# Patient Record
Sex: MALE | Race: BLACK OR AFRICAN AMERICAN | Employment: FULL TIME | URBAN - METROPOLITAN AREA
[De-identification: names, ages, dates, MRNs, and addresses within clinical notes are randomized per-mention and may not be internally consistent; named-entity substitution may affect disease eponyms.]

---

## 2022-10-22 ENCOUNTER — HOSPITAL ENCOUNTER (EMERGENCY)
Age: 62
Discharge: HOME | End: 2022-10-23
Attending: EMERGENCY MEDICINE
Payer: COMMERCIAL

## 2022-10-22 DIAGNOSIS — R07.9 CHEST PAIN, UNSPECIFIED TYPE: Primary | ICD-10-CM

## 2022-10-22 PROCEDURE — 99285 EMERGENCY DEPT VISIT HI MDM: CPT

## 2022-10-22 PROCEDURE — 96375 TX/PRO/DX INJ NEW DRUG ADDON: CPT

## 2022-10-22 PROCEDURE — 96374 THER/PROPH/DIAG INJ IV PUSH: CPT

## 2022-10-22 PROCEDURE — 93005 ELECTROCARDIOGRAM TRACING: CPT | Performed by: EMERGENCY MEDICINE

## 2022-10-22 ASSESSMENT — PAIN DESCRIPTION - DESCRIPTORS: DESCRIPTORS: PRESSURE

## 2022-10-22 ASSESSMENT — PAIN - FUNCTIONAL ASSESSMENT: PAIN_FUNCTIONAL_ASSESSMENT: 0-10

## 2022-10-22 ASSESSMENT — PAIN DESCRIPTION - FREQUENCY: FREQUENCY: CONTINUOUS

## 2022-10-22 ASSESSMENT — PAIN DESCRIPTION - LOCATION: LOCATION: CHEST

## 2022-10-22 ASSESSMENT — PAIN SCALES - GENERAL: PAINLEVEL_OUTOF10: 10

## 2022-10-23 ENCOUNTER — APPOINTMENT (OUTPATIENT)
Dept: GENERAL RADIOLOGY | Age: 62
End: 2022-10-23
Payer: COMMERCIAL

## 2022-10-23 VITALS
BODY MASS INDEX: 21.48 KG/M2 | DIASTOLIC BLOOD PRESSURE: 68 MMHG | WEIGHT: 145 LBS | SYSTOLIC BLOOD PRESSURE: 117 MMHG | HEART RATE: 92 BPM | HEIGHT: 69 IN | RESPIRATION RATE: 13 BRPM | TEMPERATURE: 97.9 F | OXYGEN SATURATION: 100 %

## 2022-10-23 LAB
A/G RATIO: 1.5 (ref 1.1–2.2)
ALBUMIN SERPL-MCNC: 3.8 G/DL (ref 3.4–5)
ALP BLD-CCNC: 86 U/L (ref 40–129)
ALT SERPL-CCNC: 39 U/L (ref 10–40)
ANION GAP SERPL CALCULATED.3IONS-SCNC: 14 MMOL/L (ref 3–16)
AST SERPL-CCNC: 47 U/L (ref 15–37)
BILIRUB SERPL-MCNC: 0.5 MG/DL (ref 0–1)
BUN BLDV-MCNC: 13 MG/DL (ref 7–20)
CALCIUM SERPL-MCNC: 9.2 MG/DL (ref 8.3–10.6)
CHLORIDE BLD-SCNC: 107 MMOL/L (ref 99–110)
CO2: 19 MMOL/L (ref 21–32)
CREAT SERPL-MCNC: 1.1 MG/DL (ref 0.8–1.3)
EKG ATRIAL RATE: 82 BPM
EKG DIAGNOSIS: NORMAL
EKG P AXIS: 65 DEGREES
EKG P-R INTERVAL: 170 MS
EKG Q-T INTERVAL: 350 MS
EKG QRS DURATION: 78 MS
EKG QTC CALCULATION (BAZETT): 408 MS
EKG R AXIS: 42 DEGREES
EKG T AXIS: 54 DEGREES
EKG VENTRICULAR RATE: 82 BPM
GFR SERPL CREATININE-BSD FRML MDRD: >60 ML/MIN/{1.73_M2}
GLUCOSE BLD-MCNC: 109 MG/DL (ref 70–99)
HCT VFR BLD CALC: 40.5 % (ref 40.5–52.5)
HEMOGLOBIN: 13.4 G/DL (ref 13.5–17.5)
LIPASE: 28 U/L (ref 13–60)
MCH RBC QN AUTO: 33.6 PG (ref 26–34)
MCHC RBC AUTO-ENTMCNC: 33 G/DL (ref 31–36)
MCV RBC AUTO: 101.6 FL (ref 80–100)
PDW BLD-RTO: 12.9 % (ref 12.4–15.4)
PLATELET # BLD: 206 K/UL (ref 135–450)
PMV BLD AUTO: 7.4 FL (ref 5–10.5)
POTASSIUM REFLEX MAGNESIUM: 3.8 MMOL/L (ref 3.5–5.1)
PRO-BNP: 10 PG/ML (ref 0–124)
RBC # BLD: 3.99 M/UL (ref 4.2–5.9)
SODIUM BLD-SCNC: 140 MMOL/L (ref 136–145)
TOTAL PROTEIN: 6.4 G/DL (ref 6.4–8.2)
TROPONIN: <0.01 NG/ML
TROPONIN: <0.01 NG/ML
WBC # BLD: 10 K/UL (ref 4–11)

## 2022-10-23 PROCEDURE — 36415 COLL VENOUS BLD VENIPUNCTURE: CPT

## 2022-10-23 PROCEDURE — 84484 ASSAY OF TROPONIN QUANT: CPT

## 2022-10-23 PROCEDURE — 6360000002 HC RX W HCPCS: Performed by: STUDENT IN AN ORGANIZED HEALTH CARE EDUCATION/TRAINING PROGRAM

## 2022-10-23 PROCEDURE — C9113 INJ PANTOPRAZOLE SODIUM, VIA: HCPCS | Performed by: STUDENT IN AN ORGANIZED HEALTH CARE EDUCATION/TRAINING PROGRAM

## 2022-10-23 PROCEDURE — 85027 COMPLETE CBC AUTOMATED: CPT

## 2022-10-23 PROCEDURE — 96375 TX/PRO/DX INJ NEW DRUG ADDON: CPT

## 2022-10-23 PROCEDURE — 80053 COMPREHEN METABOLIC PANEL: CPT

## 2022-10-23 PROCEDURE — 96374 THER/PROPH/DIAG INJ IV PUSH: CPT

## 2022-10-23 PROCEDURE — 6370000000 HC RX 637 (ALT 250 FOR IP): Performed by: STUDENT IN AN ORGANIZED HEALTH CARE EDUCATION/TRAINING PROGRAM

## 2022-10-23 PROCEDURE — 83690 ASSAY OF LIPASE: CPT

## 2022-10-23 PROCEDURE — 71045 X-RAY EXAM CHEST 1 VIEW: CPT

## 2022-10-23 PROCEDURE — 83880 ASSAY OF NATRIURETIC PEPTIDE: CPT

## 2022-10-23 RX ORDER — ASPIRIN 325 MG
325 TABLET ORAL ONCE
Status: COMPLETED | OUTPATIENT
Start: 2022-10-23 | End: 2022-10-23

## 2022-10-23 RX ORDER — CALCIUM CARBONATE 500(1250)
500 TABLET ORAL DAILY
COMMUNITY

## 2022-10-23 RX ORDER — AMLODIPINE BESYLATE 10 MG/1
10 TABLET ORAL DAILY
COMMUNITY

## 2022-10-23 RX ORDER — ATORVASTATIN CALCIUM 10 MG/1
10 TABLET, FILM COATED ORAL DAILY
COMMUNITY

## 2022-10-23 RX ORDER — MORPHINE SULFATE 4 MG/ML
4 INJECTION, SOLUTION INTRAMUSCULAR; INTRAVENOUS
Status: COMPLETED | OUTPATIENT
Start: 2022-10-23 | End: 2022-10-23

## 2022-10-23 RX ORDER — OMEPRAZOLE 20 MG/1
20 CAPSULE, DELAYED RELEASE ORAL
Qty: 30 CAPSULE | Refills: 0 | Status: SHIPPED | OUTPATIENT
Start: 2022-10-23

## 2022-10-23 RX ORDER — ONDANSETRON 2 MG/ML
8 INJECTION INTRAMUSCULAR; INTRAVENOUS ONCE
Status: COMPLETED | OUTPATIENT
Start: 2022-10-23 | End: 2022-10-23

## 2022-10-23 RX ORDER — PANTOPRAZOLE SODIUM 40 MG/10ML
40 INJECTION, POWDER, LYOPHILIZED, FOR SOLUTION INTRAVENOUS ONCE
Status: COMPLETED | OUTPATIENT
Start: 2022-10-23 | End: 2022-10-23

## 2022-10-23 RX ADMIN — ASPIRIN 325 MG ORAL TABLET 325 MG: 325 PILL ORAL at 00:32

## 2022-10-23 RX ADMIN — MORPHINE SULFATE 4 MG: 4 INJECTION, SOLUTION INTRAMUSCULAR; INTRAVENOUS at 00:28

## 2022-10-23 RX ADMIN — ONDANSETRON 8 MG: 2 INJECTION INTRAMUSCULAR; INTRAVENOUS at 00:28

## 2022-10-23 RX ADMIN — PANTOPRAZOLE SODIUM 40 MG: 40 INJECTION, POWDER, FOR SOLUTION INTRAVENOUS at 01:24

## 2022-10-23 RX ADMIN — Medication: at 01:24

## 2022-10-23 ASSESSMENT — PAIN SCALES - GENERAL: PAINLEVEL_OUTOF10: 10

## 2022-10-23 NOTE — ED NOTES
Discharge instructions given per provider order. 1 Prescription(s) reviewed. Patient verbalized understanding.         Maranda Landers RN  10/23/22 2089

## 2022-10-23 NOTE — ED PROVIDER NOTES
ED Attending Attestation Note     Date of evaluation: 10/22/2022    This patient was seen by the resident. I have seen and examined the patient, agree with the workup, evaluation, management and diagnosis. The care plan has been discussed. I have reviewed the ECG and concur with the resident's interpretation. My assessment reveals a 49-year-old male presenting to the emergency department with complaint of epigastric abdominal pain and chest pain. On examination patient has epigastric tenderness to palpation without rebound or guarding.      Piper Apodaca MD  10/23/22 6168

## 2022-10-23 NOTE — ED PROVIDER NOTES
[id number] [facility] RESIDENT NOTE       Date of evaluation: 10/22/2022    Chief Complaint     Chest Pain (Patient states chest pressure 2300. States was sitting at work and felt heaviness in chest with sweating and nausea. )    of Present Illness     Miladis Britt is a pleasant 58 y.o. male with history of hypertension, hyperlipidemia, smoking who presents with chest pain. Patient states about 1 hour prior to arrival he had sudden onset \"throbbing\" chest pain radiating down to his upper epigastrium. He was sitting at work when the chest pain started and it has remained the same since. He states it is similar episode several months ago that resolved spontaneously after several hours. He denies any known cardiac history, has never had a stress test or cardiac ultrasound. His chest pain has been accompanied by shortness of breath, nausea with several episodes of NBNB vomiting. He denies any recent fevers or illnesses. No changes in bowel or bladder habits. He has not yet taken anything for pain. The patient denies any aggravating or alleviating factors or associated symptoms other than discussed above. Review of Systems     A comprehensive review of systems was performed and negative except as noted previously in the HPI. Past Medical, Surgical, Family, and Social History     He has a past medical history of Hyperlipidemia. He has no past surgical history on file. His family history is not on file. He reports that he has been smoking cigarettes. He has been smoking an average of .5 packs per day. He does not have any smokeless tobacco history on file. He reports current alcohol use of about 3.0 standard drinks per week. He reports that he does not use drugs.     Medications     Discharge Medication List as of 10/23/2022  2:16 AM        CONTINUE these medications which have NOT CHANGED    Details   amLODIPine (NORVASC) 10 MG tablet Take 10 mg by mouth dailyHistorical Med      calcium carbonate (OSCAL) 500 MG TABS tablet Take 500 mg by mouth dailyHistorical Med      atorvastatin (LIPITOR) 10 MG tablet Take 10 mg by mouth daily Unsure of doseHistorical Med           Allergies     He is allergic to lisinopril. Physical Exam     INITIAL VITALS: BP: 134/84, Temp: 97.9 °F (36.6 °C), Heart Rate: 90, Resp: 15, SpO2: 100 %     General: Overall well-appearing. Head: Normocephalic/atraumatic. Eyes: Anicteric. Pupils reactive. No discharge from eyes. ENT: External ears normal. Patient wearing mask. Neck: Supple, trachea midline. Pulmonary: Non-labored breathing. Breath sounds clear bilaterally. Cardiac: Regular rate. Regular rhythm. No murmurs. Abdomen: Soft. Non-distended. Non-tender. Musculoskeletal: No long bone deformity. Skin: Dry, no rashes. Extremities: Warm and well perfused. No peripheral edema. Neuro: A&O x3. Moves all four extremities to command. Sensation grossly intact to light touch. No focal deficit. Psych: Mood and affect appropriate for situation. DiagnosticResults     EKG   Indication: Chest pain    Rate: 82 bpm  Rhythm: Normal sinus  Axis: Normal  Intervals:  QRS 78 QT/QTc 350/408  Ischemia: None  Ectopy: None  Other: Borderline LVH based on voltage  Comparison: None available    Clinical Impression: Normal sinus rhythm without evidence of acute scheming    Interpreted in conjunction with emergencydepartment physician Pedro Boykin MD    RADIOLOGY:  XR CHEST PORTABLE   Final Result   1. No acute cardiopulmonary abnormalities.         LABS:   Results for orders placed or performed during the hospital encounter of 10/22/22   CBC   Result Value Ref Range    WBC 10.0 4.0 - 11.0 K/uL    RBC 3.99 (L) 4.20 - 5.90 M/uL    Hemoglobin 13.4 (L) 13.5 - 17.5 g/dL    Hematocrit 40.5 40.5 - 52.5 %    .6 (H) 80.0 - 100.0 fL    MCH 33.6 26.0 - 34.0 pg    MCHC 33.0 31.0 - 36.0 g/dL    RDW 12.9 12.4 - 15.4 %    Platelets 329 565 - 321 K/uL    MPV 7.4 5.0 - 10.5 fL   CMP w/ Reflex to MG   Result Value Ref Range    Sodium 140 136 - 145 mmol/L    Potassium reflex Magnesium 3.8 3.5 - 5.1 mmol/L    Chloride 107 99 - 110 mmol/L    CO2 19 (L) 21 - 32 mmol/L    Anion Gap 14 3 - 16    Glucose 109 (H) 70 - 99 mg/dL    BUN 13 7 - 20 mg/dL    Creatinine 1.1 0.8 - 1.3 mg/dL    Est, Glom Filt Rate >60 >60    Calcium 9.2 8.3 - 10.6 mg/dL    Total Protein 6.4 6.4 - 8.2 g/dL    Albumin 3.8 3.4 - 5.0 g/dL    Albumin/Globulin Ratio 1.5 1.1 - 2.2    Total Bilirubin 0.5 0.0 - 1.0 mg/dL    Alkaline Phosphatase 86 40 - 129 U/L    ALT 39 10 - 40 U/L    AST 47 (H) 15 - 37 U/L   Troponin   Result Value Ref Range    Troponin <0.01 <0.01 ng/mL   Troponin   Result Value Ref Range    Troponin <0.01 <0.01 ng/mL   Lipase   Result Value Ref Range    Lipase 28.0 13.0 - 60.0 U/L   Brain Natriuretic Peptide   Result Value Ref Range    Pro-BNP 10 0 - 124 pg/mL       ED BEDSIDE ULTRASOUND:  No bedside ultrasound performed. RECENT VITALS:  BP: 117/68, Temp: 97.9 °F (36.6 °C), Heart Rate: 92,Resp: 13, SpO2: 100 %     Procedures     No ED procedures performed. ED Course     Nursing Notes, Past Medical Hx, Past Surgical Hx, Social Hx, Allergies, and Family Hx were reviewed. The patient was given the followingmedications:  Orders Placed This Encounter   Medications    morphine injection 4 mg    ondansetron (ZOFRAN) injection 8 mg    aspirin tablet 325 mg    pantoprazole (PROTONIX) injection 40 mg    aluminum & magnesium hydroxide-simethicone (MAALOX) 30 mL, lidocaine viscous hcl (XYLOCAINE) 5 mL (GI COCKTAIL)    omeprazole (PRILOSEC) 20 MG delayed release capsule     Sig: Take 1 capsule by mouth every morning (before breakfast)     Dispense:  30 capsule     Refill:  0     CONSULTS:  None    MEDICAL DECISION MAKING / ASSESSMENT / Viky Seat is a 58 y.o. male with a history and presentation as described above in HPI.  Appropriate labs and diagnostic studies were reviewed as they were made available. Pertinent laboratory studies in medical decision making are listed below. Upon presentation, the patient was well-appearing, afebrile, and hemodynamically stable. Based on the patient's description of their pain and their risk factors, I initially had a moderate suspicion for ACS as etiology of her chest pain and treated with aspirin and morphine without significant improvement in pain. On his cardiac work-up, there are no signs of ST elevation or ischemia on EKG and troponins collected have been negative making acute myocardial infarction unlikely. EKG is also not consistent with pericarditis. Pneumonia, aortic dissection and pneumothorax are unlikely based on the results of their CXR. He also has equally bilateral radial pulses. PE is unlikely based on the patient's history, their lack of tachycardia, hypoxia, and tachypnea. Further evaluation for PE was felt to be unnecessary due as the [facility] criteria were not met. Given lower concern for cardiac causes following the work-up above, trialed Protonix and a GI cocktail which did result in moderate improvement in symptoms. Hepatic function testing and lipase were largely unremarkable, mildly elevated AST but not consistent with acute hepatitis. At this point, suspect that reflux is most likely etiology the patient's pain and so we will trial outpatient management with omeprazole. However, with his age and risk factors cardiac causes of chest pain are certainly high risk. He has never had formal stress testing or an outpatient echocardiogram.  He does have a primary care provider that he sees regularly and can call Monday morning to schedule an outpatient appointment. We will refer to them to help schedule a stress test and any additional cardiac work-up that may be indicated. Patient was comfortable with this plan and is discharged in stable condition.     This patient was also evaluated by the attending physician, Dr. Adriel Resendez MD. All care plans were discussed and agreed upon. Clinical Impression     1. Chest pain, unspecified type        Disposition     PATIENT REFERRED TO:  Your PCP    Call in 1 day      DISCHARGE MEDICATIONS:  Discharge Medication List as of 10/23/2022  2:16 AM        START taking these medications    Details   omeprazole (PRILOSEC) 20 MG delayed release capsule Take 1 capsule by mouth every morning (before breakfast), Disp-30 capsule, R-0Print             DISPOSITION Decision To Discharge 10/23/2022 02:07:14 AM    At this time, the patient was deemed appropriate for discharge. My customary discharge instructions, including strict return precautions for new or worsening symptoms concerning to the patient, were provided. All of patient's questions were answered satisfactorily, and she was subsequently sent home in stable condition. Cielo Rodriguez MD  PGY-2 Emergency [facility]    This note was dictated using voice-recognition software, which occasionally leads to inadvertent typographic errors.      Renate Duffy MD  Resident  10/23/22 0549

## 2025-03-11 ENCOUNTER — HOSPITAL ENCOUNTER (EMERGENCY)
Age: 65
Discharge: LEFT BEFORE DISCHARGE | End: 2025-03-11
Attending: INTERNAL MEDICINE
Payer: COMMERCIAL

## 2025-03-11 ENCOUNTER — APPOINTMENT (OUTPATIENT)
Dept: CT IMAGING | Age: 65
End: 2025-03-11
Payer: COMMERCIAL

## 2025-03-11 VITALS
DIASTOLIC BLOOD PRESSURE: 75 MMHG | RESPIRATION RATE: 18 BRPM | SYSTOLIC BLOOD PRESSURE: 120 MMHG | HEIGHT: 69 IN | HEART RATE: 97 BPM | TEMPERATURE: 98.6 F | OXYGEN SATURATION: 96 % | WEIGHT: 135.7 LBS | BODY MASS INDEX: 20.1 KG/M2

## 2025-03-11 DIAGNOSIS — K82.8 GALLBLADDER MASS: ICD-10-CM

## 2025-03-11 DIAGNOSIS — K81.0 ACUTE CHOLECYSTITIS: Primary | ICD-10-CM

## 2025-03-11 DIAGNOSIS — R74.01 TRANSAMINITIS: ICD-10-CM

## 2025-03-11 DIAGNOSIS — E87.1 HYPONATREMIA: ICD-10-CM

## 2025-03-11 LAB
ALBUMIN SERPL-MCNC: 3.9 G/DL (ref 3.4–5)
ALBUMIN/GLOB SERPL: 0.9 {RATIO} (ref 1.1–2.2)
ALP SERPL-CCNC: 158 U/L (ref 40–129)
ALT SERPL-CCNC: 33 U/L (ref 10–40)
ANION GAP SERPL CALCULATED.3IONS-SCNC: 13 MMOL/L (ref 3–16)
AST SERPL-CCNC: 64 U/L (ref 15–37)
BACTERIA URNS QL MICRO: ABNORMAL /HPF
BASOPHILS # BLD: 0 K/UL (ref 0–0.2)
BASOPHILS NFR BLD: 0.5 %
BILIRUB SERPL-MCNC: 0.6 MG/DL (ref 0–1)
BILIRUB UR QL STRIP.AUTO: ABNORMAL
BUN SERPL-MCNC: 8 MG/DL (ref 7–20)
CALCIUM SERPL-MCNC: 9.8 MG/DL (ref 8.3–10.6)
CHLORIDE SERPL-SCNC: 97 MMOL/L (ref 99–110)
CLARITY UR: CLEAR
CO2 SERPL-SCNC: 24 MMOL/L (ref 21–32)
COLOR UR: ABNORMAL
CREAT SERPL-MCNC: 1 MG/DL (ref 0.8–1.3)
DEPRECATED RDW RBC AUTO: 13.6 % (ref 12.4–15.4)
EOSINOPHIL # BLD: 0 K/UL (ref 0–0.6)
EOSINOPHIL NFR BLD: 0.4 %
EPI CELLS #/AREA URNS AUTO: 5 /HPF (ref 0–5)
GFR SERPLBLD CREATININE-BSD FMLA CKD-EPI: 84 ML/MIN/{1.73_M2}
GLUCOSE SERPL-MCNC: 262 MG/DL (ref 70–99)
GLUCOSE UR STRIP.AUTO-MCNC: NEGATIVE MG/DL
HCT VFR BLD AUTO: 42.4 % (ref 40.5–52.5)
HGB BLD-MCNC: 14.3 G/DL (ref 13.5–17.5)
HGB UR QL STRIP.AUTO: NEGATIVE
HYALINE CASTS #/AREA URNS AUTO: 54 /LPF (ref 0–8)
KETONES UR STRIP.AUTO-MCNC: ABNORMAL MG/DL
LEUKOCYTE ESTERASE UR QL STRIP.AUTO: ABNORMAL
LIPASE SERPL-CCNC: 39 U/L (ref 13–60)
LYMPHOCYTES # BLD: 1.4 K/UL (ref 1–5.1)
LYMPHOCYTES NFR BLD: 15.7 %
MCH RBC QN AUTO: 34.2 PG (ref 26–34)
MCHC RBC AUTO-ENTMCNC: 33.8 G/DL (ref 31–36)
MCV RBC AUTO: 101.2 FL (ref 80–100)
MONOCYTES # BLD: 0.8 K/UL (ref 0–1.3)
MONOCYTES NFR BLD: 9.2 %
NEUTROPHILS # BLD: 6.4 K/UL (ref 1.7–7.7)
NEUTROPHILS NFR BLD: 74.2 %
NITRITE UR QL STRIP.AUTO: NEGATIVE
PH UR STRIP.AUTO: 5.5 [PH] (ref 5–8)
PLATELET # BLD AUTO: 218 K/UL (ref 135–450)
PMV BLD AUTO: 7 FL (ref 5–10.5)
POTASSIUM SERPL-SCNC: 3.8 MMOL/L (ref 3.5–5.1)
PROT SERPL-MCNC: 8.2 G/DL (ref 6.4–8.2)
PROT UR STRIP.AUTO-MCNC: 100 MG/DL
RBC # BLD AUTO: 4.19 M/UL (ref 4.2–5.9)
RBC #/AREA URNS HPF: ABNORMAL /HPF (ref 0–4)
SODIUM SERPL-SCNC: 134 MMOL/L (ref 136–145)
SP GR UR STRIP.AUTO: 1.02 (ref 1–1.03)
UA COMPLETE W REFLEX CULTURE PNL UR: ABNORMAL
UA DIPSTICK W REFLEX MICRO PNL UR: YES
URN SPEC COLLECT METH UR: ABNORMAL
UROBILINOGEN UR STRIP-ACNC: 1 E.U./DL
WBC # BLD AUTO: 8.7 K/UL (ref 4–11)
WBC #/AREA URNS AUTO: 6 /HPF (ref 0–5)

## 2025-03-11 PROCEDURE — 87150 DNA/RNA AMPLIFIED PROBE: CPT

## 2025-03-11 PROCEDURE — 83690 ASSAY OF LIPASE: CPT

## 2025-03-11 PROCEDURE — 96375 TX/PRO/DX INJ NEW DRUG ADDON: CPT

## 2025-03-11 PROCEDURE — 96365 THER/PROPH/DIAG IV INF INIT: CPT

## 2025-03-11 PROCEDURE — 6360000004 HC RX CONTRAST MEDICATION: Performed by: INTERNAL MEDICINE

## 2025-03-11 PROCEDURE — 74177 CT ABD & PELVIS W/CONTRAST: CPT

## 2025-03-11 PROCEDURE — 85025 COMPLETE CBC W/AUTO DIFF WBC: CPT

## 2025-03-11 PROCEDURE — 99285 EMERGENCY DEPT VISIT HI MDM: CPT

## 2025-03-11 PROCEDURE — APPNB30 APP NON BILLABLE TIME 0-30 MINS: Performed by: NURSE PRACTITIONER

## 2025-03-11 PROCEDURE — 2580000003 HC RX 258: Performed by: INTERNAL MEDICINE

## 2025-03-11 PROCEDURE — 80053 COMPREHEN METABOLIC PANEL: CPT

## 2025-03-11 PROCEDURE — 87040 BLOOD CULTURE FOR BACTERIA: CPT

## 2025-03-11 PROCEDURE — 6360000002 HC RX W HCPCS: Performed by: INTERNAL MEDICINE

## 2025-03-11 PROCEDURE — APPSS60 APP SPLIT SHARED TIME 46-60 MINUTES: Performed by: NURSE PRACTITIONER

## 2025-03-11 PROCEDURE — 81001 URINALYSIS AUTO W/SCOPE: CPT

## 2025-03-11 RX ORDER — KETOROLAC TROMETHAMINE 15 MG/ML
15 INJECTION, SOLUTION INTRAMUSCULAR; INTRAVENOUS ONCE
Status: COMPLETED | OUTPATIENT
Start: 2025-03-11 | End: 2025-03-11

## 2025-03-11 RX ORDER — IOPAMIDOL 755 MG/ML
75 INJECTION, SOLUTION INTRAVASCULAR
Status: COMPLETED | OUTPATIENT
Start: 2025-03-11 | End: 2025-03-11

## 2025-03-11 RX ORDER — 0.9 % SODIUM CHLORIDE 0.9 %
1000 INTRAVENOUS SOLUTION INTRAVENOUS ONCE
Status: COMPLETED | OUTPATIENT
Start: 2025-03-11 | End: 2025-03-11

## 2025-03-11 RX ADMIN — IOPAMIDOL 75 ML: 755 INJECTION, SOLUTION INTRAVENOUS at 06:19

## 2025-03-11 RX ADMIN — KETOROLAC TROMETHAMINE 15 MG: 15 INJECTION, SOLUTION INTRAMUSCULAR; INTRAVENOUS at 06:48

## 2025-03-11 RX ADMIN — PIPERACILLIN AND TAZOBACTAM 3375 MG: 3; .375 INJECTION, POWDER, LYOPHILIZED, FOR SOLUTION INTRAVENOUS at 08:06

## 2025-03-11 RX ADMIN — SODIUM CHLORIDE 1000 ML: 0.9 INJECTION, SOLUTION INTRAVENOUS at 06:48

## 2025-03-11 ASSESSMENT — PAIN DESCRIPTION - LOCATION: LOCATION: ABDOMEN

## 2025-03-11 ASSESSMENT — LIFESTYLE VARIABLES
HOW OFTEN DO YOU HAVE A DRINK CONTAINING ALCOHOL: 4 OR MORE TIMES A WEEK
HOW MANY STANDARD DRINKS CONTAINING ALCOHOL DO YOU HAVE ON A TYPICAL DAY: 5 OR 6

## 2025-03-11 ASSESSMENT — PAIN SCALES - GENERAL
PAINLEVEL_OUTOF10: 0
PAINLEVEL_OUTOF10: 8
PAINLEVEL_OUTOF10: 7

## 2025-03-11 ASSESSMENT — PAIN - FUNCTIONAL ASSESSMENT: PAIN_FUNCTIONAL_ASSESSMENT: 0-10

## 2025-03-11 NOTE — ED PROVIDER NOTES
EMERGENCY MEDICINE PROVIDER NOTE    Patient Identification  Pt Name: Dejan Benz  MRN: [id number]  Birthdate 1960  Date of evaluation: 3/11/2025  Provider: ERASMO CLANCY DO  PCP: No primary care provider on file.    Chief Complaint  Abdominal Pain (Pt arrives to ED via self c/o right sided abdominal and flank pain, states pain starting yesterday morning, denies GI symptoms, just c/o pain. Pt states pain is worse when laying down or moving.)      HPI  (History provided by patient)  This is a 64 y.o. male who was brought in by self for right upper quadrant and right flank pain that started yesterday.  The pain is worse over the right flank and worse when he pushes in.  He denies nausea and vomiting.  Patient denies take anything for pain.  Patient states it is worse when he is moving and lying down.  He still has his gallbladder.  He denies hematuria and dysuria.  He does not have any fever and chills.  Pain is moderate in intensity.    I have reviewed the following nursing documentation:  Allergies: Lisinopril    Past medical history:   Past Medical History:   Diagnosis Date    Hyperlipidemia     Hypertension      Past surgical history: No past surgical history on file.    Home medications:   Discharge Medication List as of 3/11/2025 10:33 AM        CONTINUE these medications which have NOT CHANGED    Details   amLODIPine (NORVASC) 10 MG tablet Take 1 tablet by mouth dailyHistorical Med      atorvastatin (LIPITOR) 10 MG tablet Take 1 tablet by mouth daily Unsure of doseHistorical Med      omeprazole (PRILOSEC) 20 MG delayed release capsule Take 1 capsule by mouth every morning (before breakfast), Disp-30 capsule, R-0Print      calcium carbonate (OSCAL) 500 MG TABS tablet Take 500 mg by mouth dailyHistorical Med             Social history:  reports that he has been smoking cigarettes. He does not have any smokeless tobacco history on file. He reports current alcohol use of about 3.0 standard drinks of alcohol
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
## 2025-03-11 NOTE — ED NOTES
Patient and wife called this RN to bedside. Patient asked what was taking so long, this RN informed patient he was waiting for ultrasound of his gal bladder, patient and wife requested wait time, informed wait time cannot be given and unsure where patient is on this list of scans for ultrasound. Patient and wife stated they had disabled daughter at school and needed to be home when she got out of school. Patient's wife stated \"they told us he was going to get an ultrasound and then general surgery would come back to see patient\", this RN stated that was the plan and we are waiting for ultrasound. Wife also stated \"they told us he would be down here until he got a bed upstairs and he's hungry and getting irritated and doesn't want to wait anymore. I have an appt scheduled with my primary care doctor and I have an MRI of my stomach scheduled, I don't need to ultrasound today\". Patient requested his IV be taken out so he could leave, did not want to wait for provider to talk to and did not want to sign any paper.     Dr. Peñaloza and Karen with general surgery updated. Charge RN made aware.     Patient and wife ambulatory to exit without difficulty.

## 2025-03-13 LAB — REPORT: NORMAL

## 2025-03-13 NOTE — ED NOTES
I called the pt at [phone] and spoke with him about coming in to be seen due to the positive culture. Explained to him what positive blood cultures mean. Pt agreed but stated, \"I will try to come in tomorrow (3/14) I just don't want to go through the whole 5-6 hour process\"    Ayleen Bond, PharmD  PGY-1 Pharmacy Resident

## 2025-03-13 NOTE — PROGRESS NOTES
Received a call from Microbiology 3/12/25 @2100 regarding a positive blood culture - 2 bottles positive with gram positive cocci clusters. Spoke Dr. Adler and he recommended that the pt come back to the ED to be seen. I called the pt at [phone] and spoke with him about coming in to be seen due to the positive culture. Pt agreed but stated, 'I may not be able to come today but I will try to come first thing Monday morning'.

## 2025-03-14 NOTE — PROGRESS NOTES
I called the pt at [phone] and spoke with him about coming in to be seen due to the positive culture. I discussed that this is a follow-up call to advise him to return to the ER for treatment or assessment. I did have to explain what blood cultures mean despite previous notes also stating something similar. Patient did not want to return to the ER due to long wait times but agreed to see a provider if he started to notice fevers, chills or general signs of illness.     Agustín Bazan, PharmD  PGY-1 Pharmacy Resident   [facility]  [id number]

## 2025-03-15 LAB
BACTERIA BLD CULT ORG #2: ABNORMAL
BACTERIA BLD CULT ORG #2: ABNORMAL
BACTERIA BLD CULT: ABNORMAL
ORGANISM: ABNORMAL
ORGANISM: ABNORMAL